# Patient Record
Sex: FEMALE | Race: WHITE
[De-identification: names, ages, dates, MRNs, and addresses within clinical notes are randomized per-mention and may not be internally consistent; named-entity substitution may affect disease eponyms.]

---

## 2021-08-21 ENCOUNTER — HOSPITAL ENCOUNTER (EMERGENCY)
Dept: HOSPITAL 63 - ER | Age: 56
Discharge: HOME | End: 2021-08-21
Payer: SELF-PAY

## 2021-08-21 VITALS — BODY MASS INDEX: 33.41 KG/M2 | HEIGHT: 66 IN | WEIGHT: 207.9 LBS

## 2021-08-21 VITALS — DIASTOLIC BLOOD PRESSURE: 81 MMHG | SYSTOLIC BLOOD PRESSURE: 148 MMHG
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
## 2021-08-21 DIAGNOSIS — S43.402A: Primary | ICD-10-CM

## 2021-08-21 PROCEDURE — 99283 EMERGENCY DEPT VISIT LOW MDM: CPT

## 2021-08-21 PROCEDURE — 73030 X-RAY EXAM OF SHOULDER: CPT
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
## 2021-08-21 NOTE — RAD
XR SHOULDER_LEFT 2+ VIEWS



History: Reason: fall yesterday / Spl. Instructions:  / History: . Pain



Technique: 3 views left shoulder.



Comparison: None.



Findings:

Normal alignment. No acute fracture.



Impression: 

1.  No acute osseous abnormality.



Electronically signed by: Herbert Mckeon DO (8/21/2021 4:57 PM) [id number]